# Patient Record
Sex: MALE | HISPANIC OR LATINO | Employment: FULL TIME | URBAN - METROPOLITAN AREA
[De-identification: names, ages, dates, MRNs, and addresses within clinical notes are randomized per-mention and may not be internally consistent; named-entity substitution may affect disease eponyms.]

---

## 2018-08-20 ENCOUNTER — OFFICE VISIT (OUTPATIENT)
Dept: URGENT CARE | Facility: PHYSICIAN GROUP | Age: 23
End: 2018-08-20
Payer: COMMERCIAL

## 2018-08-20 VITALS
BODY MASS INDEX: 32.9 KG/M2 | DIASTOLIC BLOOD PRESSURE: 88 MMHG | HEIGHT: 71 IN | OXYGEN SATURATION: 98 % | RESPIRATION RATE: 15 BRPM | TEMPERATURE: 98.8 F | SYSTOLIC BLOOD PRESSURE: 142 MMHG | WEIGHT: 235 LBS | HEART RATE: 91 BPM

## 2018-08-20 DIAGNOSIS — A08.4 VIRAL GASTROENTERITIS: ICD-10-CM

## 2018-08-20 DIAGNOSIS — R19.7 NAUSEA, VOMITING AND DIARRHEA: ICD-10-CM

## 2018-08-20 DIAGNOSIS — R11.2 NAUSEA, VOMITING AND DIARRHEA: ICD-10-CM

## 2018-08-20 PROCEDURE — 99204 OFFICE O/P NEW MOD 45 MIN: CPT | Performed by: NURSE PRACTITIONER

## 2018-08-20 RX ORDER — ONDANSETRON 4 MG/1
4 TABLET, ORALLY DISINTEGRATING ORAL EVERY 6 HOURS PRN
Qty: 10 TAB | Refills: 0 | Status: SHIPPED | OUTPATIENT
Start: 2018-08-20 | End: 2018-10-04

## 2018-08-20 ASSESSMENT — ENCOUNTER SYMPTOMS
ABDOMINAL PAIN: 1
BLOATING: 0
FEVER: 0
CHILLS: 0
VOMITING: 1
DIARRHEA: 1

## 2018-08-20 NOTE — LETTER
August 20, 2018         Patient: Gurwinder Robins   YOB: 1995   Date of Visit: 8/20/2018           To Whom it May Concern:    Gurwinder Robins was seen in my clinic on 8/20/2018. Please excuse him from work 8/19/18-8/21/18.        Sincerely,           JOSELUIS Martinez.  Electronically Signed

## 2018-08-20 NOTE — PROGRESS NOTES
Subjective:      Gurwinder Robins is a 23 y.o. male who presents with Diarrhea (Started Sunday. Nausea and one episode of vomiting.)            Diarrhea    This is a new problem. Episode onset: pt reports his whole family has come down with a stomach bug recently. Reports his wife and daughter had vomiting and diarrhea first and he developed the same symptoms yesterday. Denies any fever. Tolerating fluids. Last vomited yesterday. The problem occurs 2 to 4 times per day. The stool consistency is described as watery. The patient states that diarrhea does not awaken him from sleep. Associated symptoms include abdominal pain (gneralized) and vomiting. Pertinent negatives include no bloating, chills or fever. Associated symptoms comments: Reports when he thinks about eating it makes him feel nauseated. Risk factors include ill contacts. He has tried electrolyte solution for the symptoms. The treatment provided mild relief. There is no history of inflammatory bowel disease or irritable bowel syndrome.       Review of Systems   Constitutional: Negative for chills and fever.   Gastrointestinal: Positive for abdominal pain (gneralized), diarrhea and vomiting. Negative for bloating.   All other systems reviewed and are negative.    History reviewed. No pertinent past medical history. History reviewed. No pertinent surgical history.   Social History     Social History   • Marital status: Single     Spouse name: N/A   • Number of children: N/A   • Years of education: N/A     Occupational History   • Not on file.     Social History Main Topics   • Smoking status: Never Smoker   • Smokeless tobacco: Never Used   • Alcohol use Yes      Comment: on weekends   • Drug use: Yes     Types: Inhaled      Comment: THC   • Sexual activity: Not on file     Other Topics Concern   • Not on file     Social History Narrative   • No narrative on file          Objective:     /88   Pulse 91   Temp 37.1 °C (98.8 °F)   Resp 15   Ht  "1.803 m (5' 11\")   Wt 106.6 kg (235 lb)   SpO2 98%   BMI 32.78 kg/m²      Physical Exam   Constitutional: He is oriented to person, place, and time. Vital signs are normal. He appears well-developed and well-nourished.   HENT:   Head: Normocephalic and atraumatic.   Right Ear: Tympanic membrane and external ear normal.   Left Ear: Tympanic membrane and external ear normal.   Nose: Nose normal.   Mouth/Throat: Oropharynx is clear and moist.   Eyes: Pupils are equal, round, and reactive to light. EOM are normal.   Neck: Normal range of motion.   Cardiovascular: Normal rate and regular rhythm.    Pulmonary/Chest: Effort normal.   Abdominal: Soft. Normal appearance. Bowel sounds are increased. There is generalized tenderness. There is no rigidity, no rebound and no guarding.   Musculoskeletal: Normal range of motion.   Neurological: He is alert and oriented to person, place, and time.   Skin: Skin is warm and dry. Capillary refill takes less than 2 seconds.   Psychiatric: He has a normal mood and affect. His speech is normal and behavior is normal. Thought content normal.   Vitals reviewed.              Assessment/Plan:     1. Nausea, vomiting and diarrhea  - ondansetron (ZOFRAN ODT) 4 MG TABLET DISPERSIBLE; Take 1 Tab by mouth every 6 hours as needed for Nausea.  Dispense: 10 Tab; Refill: 0    2. Viral gastroenteritis    Get plenty of rest  Continue to drink electrolyte solution, when hungry encourage bland diet  Advised against anti-diarrheals  Work note provided  Strict ER precautions for localized ABD pain, increased vomiting, new onset of fevers  DDX discussed with patient include but are not limited to viral gastritis, ileus, SBO, colitis, appendicitis  Supportive care, differential diagnoses, and indications for immediate follow-up discussed with patient.    Pathogenesis of diagnosis discussed including typical length and natural progression.      Instructed to return to  or nearest emergency department if " symptoms fail to improve, for any change in condition, further concerns, or new concerning symptoms.  Patient states understanding of the plan of care and discharge instructions.

## 2018-09-14 ENCOUNTER — TELEPHONE (OUTPATIENT)
Dept: MEDICAL GROUP | Facility: PHYSICIAN GROUP | Age: 23
End: 2018-09-14

## 2018-09-14 NOTE — TELEPHONE ENCOUNTER
Future Appointments       Provider Department Center    9/17/2018 3:35 PM Sabiha Allen M.D. [facility]        NEW PATIENT VISIT PRE-VISIT PLANNING    1.  [employer] Patient is checked in Patient Demographics? YES    2.  Immunizations were updated in [facility] using WebIZ?: Yes       •  Web Iz Recommendations: FLU, HEPATITIS A  and TD    3.  Is this appointment scheduled as a Hospital Follow-Up? No    4.  Patient is due for the following Health Maintenance Topics:   Health Maintenance Due   Topic Date Due   • IMM DTaP/Tdap/Td Vaccine (7 - Td) 03/06/2017   • IMM INFLUENZA (1) 09/01/2018       5.  Reviewed/Updated the following with patient:   •   Preferred Pharmacy? NO       •   Preferred Lab? NO       •   Preferred Communication? NO       •   Allergies? NO       •   Medications? NO       •   Social History? NO       •   Family History (document living status of immediate family members and if + hx of cancer, diabetes, hypertension, hyperlipidemia, heart attack, stroke) NO    6.  Updated Care Team?       •   DME Company (gait device, O2, CPAP, etc.) NO       •   Other Specialists (eye doctor, derm, GYN, cardiology, endo, etc): NO    7.  MDX printed for Provider? NO    8.  Patient was NOT informed to arrive 15 min prior to their   scheduled appointment and bring in their medication bottles.

## 2018-10-03 ENCOUNTER — TELEPHONE (OUTPATIENT)
Dept: MEDICAL GROUP | Facility: PHYSICIAN GROUP | Age: 23
End: 2018-10-03

## 2018-10-03 NOTE — TELEPHONE ENCOUNTER
Future Appointments       Provider Department Center    10/4/2018 8:35 AM Sabiha Allen M.D. [facility]        NEW PATIENT VISIT PRE-VISIT PLANNING    1.  [employer] Patient is checked in Patient Demographics? YES    2.  Immunizations were updated in [facility] using WebIZ?: Yes       •  Web Iz Recommendations: FLU, HEPATITIS A  and TD    3.  Is this appointment scheduled as a Hospital Follow-Up? No    4.  Patient is due for the following Health Maintenance Topics:   Health Maintenance Due   Topic Date Due   • IMM DTaP/Tdap/Td Vaccine (7 - Td) 03/06/2017   • IMM INFLUENZA (1) 09/01/2018       5.  Reviewed/Updated the following with patient:   •   Preferred Pharmacy? YES       •   Preferred Lab? YES       •   Preferred Communication? YES       •   Allergies? YES       •   Medications? YES. Was Abstract Encounter opened and chart updated? YES       •   Social History? YES. Was Abstract Encounter opened and chart updated? YES       •   Family History (document living status of immediate family members and if + hx of cancer, diabetes, hypertension, hyperlipidemia, heart attack, stroke) YES. Was Abstract Encounter opened and chart updated? YES    6.  Updated Care Team?       •   DME Company (gait device, O2, CPAP, etc.) YES       •   Other Specialists (eye doctor, derm, GYN, cardiology, endo, etc): YES    7.  MDX printed for Provider? NO    8.  Patient was informed to arrive 15 min prior to their   scheduled appointment and bring in their medication bottles.

## 2018-10-04 ENCOUNTER — HOSPITAL ENCOUNTER (OUTPATIENT)
Dept: LAB | Facility: MEDICAL CENTER | Age: 23
End: 2018-10-04
Attending: FAMILY MEDICINE
Payer: COMMERCIAL

## 2018-10-04 ENCOUNTER — OFFICE VISIT (OUTPATIENT)
Dept: MEDICAL GROUP | Facility: PHYSICIAN GROUP | Age: 23
End: 2018-10-04
Payer: COMMERCIAL

## 2018-10-04 VITALS
HEART RATE: 76 BPM | WEIGHT: 243.3 LBS | TEMPERATURE: 97.5 F | OXYGEN SATURATION: 95 % | HEIGHT: 71 IN | DIASTOLIC BLOOD PRESSURE: 84 MMHG | SYSTOLIC BLOOD PRESSURE: 142 MMHG | BODY MASS INDEX: 34.06 KG/M2

## 2018-10-04 DIAGNOSIS — E66.9 OBESITY (BMI 30-39.9): ICD-10-CM

## 2018-10-04 DIAGNOSIS — F39 MOOD DISORDER (HCC): ICD-10-CM

## 2018-10-04 DIAGNOSIS — F12.10 MARIJUANA ABUSE: ICD-10-CM

## 2018-10-04 DIAGNOSIS — H00.15 CHALAZION LEFT LOWER EYELID: ICD-10-CM

## 2018-10-04 LAB
ALBUMIN SERPL BCP-MCNC: 4.4 G/DL (ref 3.2–4.9)
ALBUMIN/GLOB SERPL: 1.5 G/DL
ALP SERPL-CCNC: 59 U/L (ref 30–99)
ALT SERPL-CCNC: 56 U/L (ref 2–50)
ANION GAP SERPL CALC-SCNC: 6 MMOL/L (ref 0–11.9)
AST SERPL-CCNC: 25 U/L (ref 12–45)
BILIRUB SERPL-MCNC: 0.6 MG/DL (ref 0.1–1.5)
BUN SERPL-MCNC: 14 MG/DL (ref 8–22)
CALCIUM SERPL-MCNC: 9.6 MG/DL (ref 8.5–10.5)
CHLORIDE SERPL-SCNC: 105 MMOL/L (ref 96–112)
CHOLEST SERPL-MCNC: 170 MG/DL (ref 100–199)
CO2 SERPL-SCNC: 27 MMOL/L (ref 20–33)
CREAT SERPL-MCNC: 0.91 MG/DL (ref 0.5–1.4)
ERYTHROCYTE [DISTWIDTH] IN BLOOD BY AUTOMATED COUNT: 41.6 FL (ref 35.9–50)
EST. AVERAGE GLUCOSE BLD GHB EST-MCNC: 126 MG/DL
GLOBULIN SER CALC-MCNC: 3 G/DL (ref 1.9–3.5)
GLUCOSE SERPL-MCNC: 91 MG/DL (ref 65–99)
HBA1C MFR BLD: 6 % (ref 0–5.6)
HCT VFR BLD AUTO: 51.6 % (ref 42–52)
HDLC SERPL-MCNC: 48 MG/DL
HGB BLD-MCNC: 16.9 G/DL (ref 14–18)
LDLC SERPL CALC-MCNC: 78 MG/DL
MCH RBC QN AUTO: 29.5 PG (ref 27–33)
MCHC RBC AUTO-ENTMCNC: 32.8 G/DL (ref 33.7–35.3)
MCV RBC AUTO: 90.1 FL (ref 81.4–97.8)
PLATELET # BLD AUTO: 308 K/UL (ref 164–446)
PMV BLD AUTO: 10.1 FL (ref 9–12.9)
POTASSIUM SERPL-SCNC: 4.8 MMOL/L (ref 3.6–5.5)
PROT SERPL-MCNC: 7.4 G/DL (ref 6–8.2)
RBC # BLD AUTO: 5.73 M/UL (ref 4.7–6.1)
SODIUM SERPL-SCNC: 138 MMOL/L (ref 135–145)
TRIGL SERPL-MCNC: 219 MG/DL (ref 0–149)
TSH SERPL DL<=0.005 MIU/L-ACNC: 2.81 UIU/ML (ref 0.38–5.33)
VIT B12 SERPL-MCNC: 991 PG/ML (ref 211–911)
WBC # BLD AUTO: 8 K/UL (ref 4.8–10.8)

## 2018-10-04 PROCEDURE — 80061 LIPID PANEL: CPT

## 2018-10-04 PROCEDURE — 80053 COMPREHEN METABOLIC PANEL: CPT

## 2018-10-04 PROCEDURE — 84443 ASSAY THYROID STIM HORMONE: CPT

## 2018-10-04 PROCEDURE — 82607 VITAMIN B-12: CPT

## 2018-10-04 PROCEDURE — 99204 OFFICE O/P NEW MOD 45 MIN: CPT | Performed by: FAMILY MEDICINE

## 2018-10-04 PROCEDURE — 85027 COMPLETE CBC AUTOMATED: CPT

## 2018-10-04 PROCEDURE — 36415 COLL VENOUS BLD VENIPUNCTURE: CPT

## 2018-10-04 PROCEDURE — 83036 HEMOGLOBIN GLYCOSYLATED A1C: CPT

## 2018-10-04 RX ORDER — BACITRACIN ZINC AND POLYMYXIN B SULFATE 500; 10000 [USP'U]/G; [USP'U]/G
1 OINTMENT OPHTHALMIC EVERY 12 HOURS
Qty: 1 TUBE | Refills: 0 | Status: SHIPPED | OUTPATIENT
Start: 2018-10-04 | End: 2018-10-11

## 2018-10-04 ASSESSMENT — PATIENT HEALTH QUESTIONNAIRE - PHQ9
CLINICAL INTERPRETATION OF PHQ2 SCORE: 2
5. POOR APPETITE OR OVEREATING: 2 - MORE THAN HALF THE DAYS
SUM OF ALL RESPONSES TO PHQ QUESTIONS 1-9: 14

## 2018-10-04 NOTE — PROGRESS NOTES
"cc: low mood and anxiety       Subjective:     Gurwinder Robins is a 23 y.o. male presenting for the following:     Mood-patient presents today to discuss low mood.  He admits that since he was a teenager he has had difficulty with both daily worry and low mood.  He is slightly vague about his upbringing but does say that he had some instability and that there was some mental illness in his mother.  His sisters also struggled with their upbringing.    Struggles with worry that can keep up at night, low mood, anhedonia, low appetite.  He denies any suicidal ideation.    He is very interested in therapy for this and talking about his past.    Also patient mentions that for the last year he has had a painless bump on his left lower eyelid.  This is actually gotten slightly smaller.  It is never become painful.    Review of systems:  All others reviewed and are negative.       Current Outpatient Prescriptions:   •  bacitracin-polymyxin b (POLYSPORIN) 500-18085 UNIT/GM Ointment, Place 1 Drop in left eye every 12 hours for 7 days., Disp: 1 Tube, Rfl: 0    Allergies, past medical history, past surgical history, family history, social history reviewed and updated    Objective:     Vitals: /84 (BP Location: Left arm, Patient Position: Sitting, BP Cuff Size: Adult)   Pulse 76   Temp 36.4 °C (97.5 °F) (Temporal)   Ht 1.803 m (5' 11\")   Wt 110.4 kg (243 lb 4.8 oz)   SpO2 95%   BMI 33.93 kg/m²   General: Alert, pleasant, NAD  HEENT: Normocephalic.   EOMI, no icterus or pallor.  Conjunctivae  normal.  Left lower eyelid with small circular subcutaneous lump.  Nontender.  External ears normal. Oropharynx non-erythematous, mucous membranes moist.    Neck supple.  No thyromegaly or masses palpated. No cervical or supraclavicular lymphadenopathy.  Heart: Regular rate and rhythm.  S1 and S2 normal.  No murmurs appreciated.  Respiratory: Normal respiratory effort.  Clear to auscultation bilaterally.  Abdomen: " Non-distended, soft  Skin: Warm, dry, no rashes.  Musculoskeletal: Gait is normal.  Moves all extremities well.  Extremities: No leg edema.    Neurological: No tremors, CN2-12 grossly intact  Psych:  Affect is slightly blunted but appropriate, judgement is good, memory is intact, grooming is appropriate.    Assessment/Plan:     Gurwinder was seen today for depression and establish care.    Diagnoses and all orders for this visit:    Obesity (BMI 30-39.9): Spoke briefly about this today.  Patient encouraged to watch his diet.  -     LIPID PROFILE; Future  -     HEMOGLOBIN A1C; Future  -     Patient identified as having weight management issue.  Appropriate orders and counseling given.        Mood disorder (HCC): Patient with difficulty with low mood and daily worry since he was a teenager.  Seemingly with an unstable upbringing.  No suicidal ideation.  Will obtain bloods to ensure no medical cause for low mood, but this is unlikely.  Referral to behavioral health placed, as patient is very interested in therapy.    -     CBC WITHOUT DIFFERENTIAL; Future  -     TSH WITH REFLEX TO FT4; Future  -     VITAMIN B12; Future  -     COMP METABOLIC PANEL; Future  -     Patient has been identified as being depressed and appropriate orders and counseling have been given  -     REFERRAL TO BEHAVIORAL HEALTH    Marijuana abuse: Also spoke to patient today about marijuana use.  He has been a daily user since he was a teenager.  He did stop for almost a year while his partner was pregnant.  But he started using again about 7 months ago.  He uses daily and cannot get to sleep without this.  Explained that this is an addictive drug and that it does have mood effects.     Chalazion: Suggest warm compresses. No signs of infection currently. If drains, may use antibiotic eyedrop to avoid conjunctivitis.     Other orders  -     bacitracin-polymyxin b (POLYSPORIN) [id number] UNIT/GM Ointment; Place 1 Drop in left eye every 12 hours for 7  days.      Return if symptoms worsen or fail to improve.

## 2018-11-29 ENCOUNTER — OFFICE VISIT (OUTPATIENT)
Dept: URGENT CARE | Facility: PHYSICIAN GROUP | Age: 23
End: 2018-11-29
Payer: COMMERCIAL

## 2018-11-29 VITALS
HEIGHT: 71 IN | BODY MASS INDEX: 34.02 KG/M2 | OXYGEN SATURATION: 95 % | SYSTOLIC BLOOD PRESSURE: 122 MMHG | HEART RATE: 91 BPM | DIASTOLIC BLOOD PRESSURE: 76 MMHG | RESPIRATION RATE: 16 BRPM | TEMPERATURE: 98.5 F | WEIGHT: 243 LBS

## 2018-11-29 DIAGNOSIS — H69.92 DYSFUNCTION OF LEFT EUSTACHIAN TUBE: ICD-10-CM

## 2018-11-29 DIAGNOSIS — H60.502 ACUTE OTITIS EXTERNA OF LEFT EAR, UNSPECIFIED TYPE: ICD-10-CM

## 2018-11-29 PROCEDURE — 99214 OFFICE O/P EST MOD 30 MIN: CPT | Performed by: PHYSICIAN ASSISTANT

## 2018-11-29 RX ORDER — AMOXICILLIN AND CLAVULANATE POTASSIUM 875; 125 MG/1; MG/1
1 TABLET, FILM COATED ORAL 2 TIMES DAILY
Qty: 20 TAB | Refills: 0 | Status: SHIPPED | OUTPATIENT
Start: 2018-11-29 | End: 2018-12-09

## 2018-11-29 RX ORDER — OFLOXACIN 3 MG/ML
5 SOLUTION AURICULAR (OTIC) DAILY
Qty: 7 ML | Refills: 0 | Status: SHIPPED | OUTPATIENT
Start: 2018-11-29 | End: 2018-12-06

## 2018-11-29 ASSESSMENT — ENCOUNTER SYMPTOMS
CHILLS: 0
HEADACHES: 0
BLURRED VISION: 0
RHINORRHEA: 1
PALPITATIONS: 0
SORE THROAT: 0
DIARRHEA: 0
MYALGIAS: 0
ABDOMINAL PAIN: 0
DIZZINESS: 0
EYE PAIN: 0
SHORTNESS OF BREATH: 0
NAUSEA: 0
COUGH: 0
FEVER: 0
VOMITING: 0

## 2018-11-30 NOTE — PROGRESS NOTES
"Subjective:      Gurwinder Robins is a 23 y.o. male who presents with Otalgia (left ear pain started this am )      Otalgia    There is pain in the left ear. This is a new problem. The current episode started today. The problem occurs constantly. The problem has been waxing and waning. There has been no fever. The pain is moderate. Associated symptoms include rhinorrhea. Pertinent negatives include no abdominal pain, coughing, diarrhea, ear discharge, headaches, hearing loss, sore throat or vomiting. He has tried nothing for the symptoms. There is no history of a chronic ear infection, hearing loss or a tympanostomy tube.       Review of Systems   Constitutional: Negative for chills and fever.   HENT: Positive for congestion, ear pain and rhinorrhea. Negative for ear discharge, hearing loss, sore throat and tinnitus.    Eyes: Negative for blurred vision and pain.   Respiratory: Negative for cough and shortness of breath.    Cardiovascular: Negative for chest pain and palpitations.   Gastrointestinal: Negative for abdominal pain, diarrhea, nausea and vomiting.   Musculoskeletal: Negative for myalgias.   Neurological: Negative for dizziness and headaches.       PMH:  has no past medical history on file.  MEDS:   Current Outpatient Prescriptions:   •  amoxicillin-clavulanate (AUGMENTIN) 875-125 MG Tab, Take 1 Tab by mouth 2 times a day for 10 days., Disp: 20 Tab, Rfl: 0  •  ofloxacin otic sol (FLOXIN OTIC) 0.3 % Solution, Place 5 Drops in left ear every day for 7 days., Disp: 7 mL, Rfl: 0  ALLERGIES: No Known Allergies  SURGHX: No past surgical history on file.  SOCHX:  reports that he has never smoked. He has never used smokeless tobacco. He reports that he drinks alcohol. He reports that he uses drugs, including Inhaled and Marijuana.  FH: Family history was reviewed, no pertinent findings to report       Objective:     /76   Pulse 91   Temp 36.9 °C (98.5 °F) (Temporal)   Resp 16   Ht 1.803 m (5' 11\")   " Wt 110.2 kg (243 lb)   SpO2 95%   BMI 33.89 kg/m²        Physical Exam   Constitutional: He is oriented to person, place, and time. He appears well-developed and well-nourished.   HENT:   Head: Normocephalic and atraumatic.   Right Ear: Tympanic membrane, external ear and ear canal normal.   Left Ear: External ear normal. There is swelling. No mastoid tenderness. Tympanic membrane is erythematous and bulging.   Nose: Mucosal edema and rhinorrhea present. Right sinus exhibits no maxillary sinus tenderness and no frontal sinus tenderness. Left sinus exhibits no maxillary sinus tenderness and no frontal sinus tenderness.   Mouth/Throat: Uvula is midline and mucous membranes are normal. Posterior oropharyngeal erythema present.   Left ear canal exhibits erythema and swelling.  Left TM is erythematous and bulging with what looks like some pus more centrally.    Eyes: Pupils are equal, round, and reactive to light. Conjunctivae are normal.   Neck: Normal range of motion.   Cardiovascular: Normal rate, regular rhythm and normal heart sounds.    No murmur heard.  Pulmonary/Chest: Effort normal and breath sounds normal. He has no wheezes.   Lymphadenopathy:     He has no cervical adenopathy.   Neurological: He is alert and oriented to person, place, and time.   Skin: Skin is warm and dry. Capillary refill takes less than 2 seconds.   Psychiatric: He has a normal mood and affect. His behavior is normal. Judgment normal.   Vitals reviewed.        Assessment/Plan:     1. Acute otitis externa of left ear, unspecified type  - ofloxacin otic sol (FLOXIN OTIC) 0.3 % Solution; Place 5 Drops in left ear every day for 7 days.  Dispense: 7 mL; Refill: 0    2. Dysfunction of left eustachian tube  - amoxicillin-clavulanate (AUGMENTIN) 875-125 MG Tab; Take 1 Tab by mouth 2 times a day for 10 days.  Dispense: 20 Tab; Refill: 0  - ofloxacin otic sol (FLOXIN OTIC) 0.3 % Solution; Place 5 Drops in left ear every day for 7 days.  Dispense: 7  mL; Refill: 0        Differential Diagnosis, natural history, and supportive care discussed. Return to the Urgent Care or follow up with your PCP if symptoms fail to resolve, or for any new or worsening symptoms. Emergency room precautions discussed. Patient and/or family appears understanding of information.

## 2019-07-08 ENCOUNTER — OFFICE VISIT (OUTPATIENT)
Dept: URGENT CARE | Facility: CLINIC | Age: 24
End: 2019-07-08
Payer: COMMERCIAL

## 2019-07-08 VITALS
HEART RATE: 80 BPM | HEIGHT: 71 IN | SYSTOLIC BLOOD PRESSURE: 132 MMHG | RESPIRATION RATE: 18 BRPM | WEIGHT: 244 LBS | BODY MASS INDEX: 34.16 KG/M2 | OXYGEN SATURATION: 98 % | TEMPERATURE: 98.2 F | DIASTOLIC BLOOD PRESSURE: 90 MMHG

## 2019-07-08 DIAGNOSIS — R10.814 LEFT LOWER QUADRANT ABDOMINAL TENDERNESS WITHOUT REBOUND TENDERNESS: ICD-10-CM

## 2019-07-08 DIAGNOSIS — R10.32 LEFT LOWER QUADRANT PAIN: ICD-10-CM

## 2019-07-08 DIAGNOSIS — R19.7 DIARRHEA, UNSPECIFIED TYPE: ICD-10-CM

## 2019-07-08 DIAGNOSIS — R11.0 NAUSEA: ICD-10-CM

## 2019-07-08 LAB
APPEARANCE UR: CLEAR
BILIRUB UR STRIP-MCNC: NORMAL MG/DL
COLOR UR AUTO: YELLOW
GLUCOSE UR STRIP.AUTO-MCNC: NORMAL MG/DL
KETONES UR STRIP.AUTO-MCNC: NORMAL MG/DL
LEUKOCYTE ESTERASE UR QL STRIP.AUTO: NORMAL
NITRITE UR QL STRIP.AUTO: NORMAL
PH UR STRIP.AUTO: 6.5 [PH] (ref 5–8)
PROT UR QL STRIP: 100 MG/DL
RBC UR QL AUTO: NORMAL
SP GR UR STRIP.AUTO: 1.02
UROBILINOGEN UR STRIP-MCNC: 0.2 MG/DL

## 2019-07-08 PROCEDURE — 81002 URINALYSIS NONAUTO W/O SCOPE: CPT | Performed by: EMERGENCY MEDICINE

## 2019-07-08 PROCEDURE — 99203 OFFICE O/P NEW LOW 30 MIN: CPT | Performed by: EMERGENCY MEDICINE

## 2019-07-08 ASSESSMENT — ENCOUNTER SYMPTOMS
VOMITING: 1
NAUSEA: 1
DIARRHEA: 1
FEVER: 0
HEADACHES: 1
SENSORY CHANGE: 0
COUGH: 0
DIZZINESS: 1
ANOREXIA: 0
SORE THROAT: 0
BLOOD IN STOOL: 0
LOSS OF CONSCIOUSNESS: 0
FOCAL WEAKNESS: 0
FLANK PAIN: 0
ABDOMINAL PAIN: 1

## 2019-07-08 NOTE — LETTER
July 8, 2019       Patient: Gurwinder Robins   YOB: 1995   Date of Visit: 7/8/2019         To Whom It May Concern:    It is my medical opinion that Gurwinder Robins is not able to attend work today or tomorrow for medical reasons.      Sincerely,          Krishna Fitzgerald M.D.  Electronically Signed

## 2019-07-09 ENCOUNTER — HOSPITAL ENCOUNTER (OUTPATIENT)
Dept: LAB | Facility: MEDICAL CENTER | Age: 24
End: 2019-07-09
Attending: EMERGENCY MEDICINE
Payer: COMMERCIAL

## 2019-07-09 DIAGNOSIS — R10.32 LEFT LOWER QUADRANT PAIN: ICD-10-CM

## 2019-07-09 DIAGNOSIS — R19.7 DIARRHEA, UNSPECIFIED TYPE: ICD-10-CM

## 2019-07-09 LAB
ALBUMIN SERPL BCP-MCNC: 4.4 G/DL (ref 3.2–4.9)
ALBUMIN/GLOB SERPL: 1.5 G/DL
ALP SERPL-CCNC: 59 U/L (ref 30–99)
ALT SERPL-CCNC: 50 U/L (ref 2–50)
ANION GAP SERPL CALC-SCNC: 10 MMOL/L (ref 0–11.9)
AST SERPL-CCNC: 25 U/L (ref 12–45)
BASOPHILS # BLD AUTO: 0.3 % (ref 0–1.8)
BASOPHILS # BLD: 0.03 K/UL (ref 0–0.12)
BILIRUB SERPL-MCNC: 0.9 MG/DL (ref 0.1–1.5)
BUN SERPL-MCNC: 10 MG/DL (ref 8–22)
C DIFF DNA SPEC QL NAA+PROBE: NEGATIVE
C DIFF TOX GENS STL QL NAA+PROBE: NEGATIVE
CALCIUM SERPL-MCNC: 9.6 MG/DL (ref 8.5–10.5)
CHLORIDE SERPL-SCNC: 103 MMOL/L (ref 96–112)
CO2 SERPL-SCNC: 26 MMOL/L (ref 20–33)
CREAT SERPL-MCNC: 0.83 MG/DL (ref 0.5–1.4)
EOSINOPHIL # BLD AUTO: 0.29 K/UL (ref 0–0.51)
EOSINOPHIL NFR BLD: 3.2 % (ref 0–6.9)
ERYTHROCYTE [DISTWIDTH] IN BLOOD BY AUTOMATED COUNT: 41.6 FL (ref 35.9–50)
FASTING STATUS PATIENT QL REPORTED: NORMAL
GLOBULIN SER CALC-MCNC: 2.9 G/DL (ref 1.9–3.5)
GLUCOSE SERPL-MCNC: 80 MG/DL (ref 65–99)
HCT VFR BLD AUTO: 53.7 % (ref 42–52)
HGB BLD-MCNC: 18.1 G/DL (ref 14–18)
IMM GRANULOCYTES # BLD AUTO: 0.03 K/UL (ref 0–0.11)
IMM GRANULOCYTES NFR BLD AUTO: 0.3 % (ref 0–0.9)
LYMPHOCYTES # BLD AUTO: 3.01 K/UL (ref 1–4.8)
LYMPHOCYTES NFR BLD: 33.1 % (ref 22–41)
MCH RBC QN AUTO: 30.6 PG (ref 27–33)
MCHC RBC AUTO-ENTMCNC: 33.7 G/DL (ref 33.7–35.3)
MCV RBC AUTO: 90.7 FL (ref 81.4–97.8)
MONOCYTES # BLD AUTO: 0.69 K/UL (ref 0–0.85)
MONOCYTES NFR BLD AUTO: 7.6 % (ref 0–13.4)
NEUTROPHILS # BLD AUTO: 5.04 K/UL (ref 1.82–7.42)
NEUTROPHILS NFR BLD: 55.5 % (ref 44–72)
NRBC # BLD AUTO: 0 K/UL
NRBC BLD-RTO: 0 /100 WBC
PLATELET # BLD AUTO: 292 K/UL (ref 164–446)
PMV BLD AUTO: 10 FL (ref 9–12.9)
POTASSIUM SERPL-SCNC: 4.1 MMOL/L (ref 3.6–5.5)
PROT SERPL-MCNC: 7.3 G/DL (ref 6–8.2)
RBC # BLD AUTO: 5.92 M/UL (ref 4.7–6.1)
SODIUM SERPL-SCNC: 139 MMOL/L (ref 135–145)
WBC # BLD AUTO: 9.1 K/UL (ref 4.8–10.8)
WBC STL QL MICRO: NORMAL

## 2019-07-09 PROCEDURE — 36415 COLL VENOUS BLD VENIPUNCTURE: CPT

## 2019-07-09 PROCEDURE — 85025 COMPLETE CBC W/AUTO DIFF WBC: CPT

## 2019-07-09 PROCEDURE — 80053 COMPREHEN METABOLIC PANEL: CPT

## 2019-07-09 PROCEDURE — 89055 LEUKOCYTE ASSESSMENT FECAL: CPT

## 2019-07-09 PROCEDURE — 87493 C DIFF AMPLIFIED PROBE: CPT

## 2019-07-09 NOTE — PROGRESS NOTES
Subjective:      Gurwinder Robins is a 24 y.o. male who presents with Emesis (jsut today, diarrhea for 1 week , abdominal pain1 week , headache 1 day, dizziness 1 day )            Diarrhea    This is a new problem. The current episode started in the past 7 days. The problem occurs 5 to 10 times per day. The problem has been unchanged. The stool consistency is described as watery. Associated symptoms include abdominal pain, headaches and vomiting. Pertinent negatives include no coughing or fever. There are no known risk factors. He has tried nothing for the symptoms.   LLQ Pain   This is a new problem. The current episode started in the past 7 days. The onset quality is undetermined. The problem occurs daily. The problem has been waxing and waning. The pain is located in the LLQ. The quality of the pain is cramping. The abdominal pain does not radiate. Associated symptoms include diarrhea, headaches, nausea and vomiting. Pertinent negatives include no anorexia, dysuria, fever, frequency or hematuria. The pain is aggravated by eating. The pain is relieved by nothing. He has tried nothing for the symptoms.   Nausea   This is a new problem. The current episode started today. The problem has been rapidly improving. Associated symptoms include abdominal pain, headaches, nausea and vomiting. Pertinent negatives include no anorexia, congestion, coughing, fever, rash or sore throat. Nothing aggravates the symptoms. He has tried nothing for the symptoms.   One episode vomiting this am.    Review of Systems   Constitutional: Negative for fever.   HENT: Negative for congestion and sore throat.    Respiratory: Negative for cough.    Gastrointestinal: Positive for abdominal pain, diarrhea, nausea and vomiting. Negative for anorexia and blood in stool.   Genitourinary: Negative for dysuria, flank pain, frequency, hematuria and urgency.   Skin: Negative for rash.   Neurological: Positive for dizziness and headaches. Negative for  "sensory change, focal weakness and loss of consciousness.     PMH:  has no past medical history on file.  MEDS: No current outpatient prescriptions on file.  ALLERGIES: No Known Allergies  SURGHX: History reviewed. No pertinent surgical history.  SOCHX:  reports that he has never smoked. He has never used smokeless tobacco. He reports that he drinks alcohol. He reports that he uses drugs, including Inhaled and Marijuana.  FH: family history includes Depression in his mother; Diabetes in his paternal grandfather; No Known Problems in his brother, father, maternal grandfather, maternal grandmother, paternal grandmother, sister, sister, sister, sister, and sister.       Objective:     /90 (BP Location: Right arm, Patient Position: Sitting, BP Cuff Size: Adult)   Pulse 80   Temp 36.8 °C (98.2 °F) (Temporal)   Resp 18   Ht 1.803 m (5' 11\")   Wt 110.7 kg (244 lb)   SpO2 98%   BMI 34.03 kg/m²      Physical Exam   Constitutional: He is oriented to person, place, and time. He appears well-developed and well-nourished. He is cooperative. He does not have a sickly appearance. He does not appear ill. No distress.   HENT:   Head: Normocephalic.   Mouth/Throat: Oropharynx is clear and moist.   Eyes: Conjunctivae and lids are normal. No scleral icterus.   Neck: Phonation normal. Neck supple. No thyromegaly present.   Cardiovascular: Normal rate, regular rhythm and normal heart sounds.    Pulmonary/Chest: Effort normal and breath sounds normal.   Abdominal: Soft. Bowel sounds are normal. He exhibits no distension, no abdominal bruit and no mass. There is tenderness in the left lower quadrant. There is no rigidity, no rebound, no guarding and no CVA tenderness. No hernia.   Genitourinary: Testes normal and penis normal. Cremasteric reflex is present. Uncircumcised.   Neurological: He is alert and oriented to person, place, and time. He exhibits normal muscle tone. Gait normal.   Skin: Skin is warm and dry. "   Psychiatric: He has a normal mood and affect.          Appears stable enough to evaluate as outpatient; advised ED if any worsening.     Assessment/Plan:     1. Left lower quadrant pain  - CT-ABDOMEN-PELVIS WITH; Future  - CBC WITH DIFFERENTIAL; Future  - Comp Metabolic Panel; Future  Small protein- POCT Urinalysis    2. Diarrhea, unspecified type  - C Diff by PCR rflx Toxin; Future  - STOOL WBC'S; Future    3. Left lower quadrant abdominal tenderness without rebound tenderness  Advised clear liquid diet only pending tests    4. Nausea

## 2019-07-10 ENCOUNTER — TELEPHONE (OUTPATIENT)
Dept: URGENT CARE | Facility: PHYSICIAN GROUP | Age: 24
End: 2019-07-10

## 2019-07-10 NOTE — TELEPHONE ENCOUNTER
Contacted patient and informed him about lab results. He was happy for the phone call. He also stated he has an appointment at [facility], he just wasn't sure when since his girlfriend made the appointment. I will reach out to [facility] to verify his appointment/ make one if need be. I will let you know what I find out.

## 2019-07-10 NOTE — TELEPHONE ENCOUNTER
Please notify patient of negative or normal blood and stool testing.  Confirm ordered CT abd/pelvis to be performed.

## 2023-02-01 ENCOUNTER — OFFICE VISIT (OUTPATIENT)
Dept: URGENT CARE | Facility: PHYSICIAN GROUP | Age: 28
End: 2023-02-01
Payer: COMMERCIAL

## 2023-02-01 VITALS
WEIGHT: 255 LBS | OXYGEN SATURATION: 98 % | TEMPERATURE: 99.2 F | SYSTOLIC BLOOD PRESSURE: 156 MMHG | RESPIRATION RATE: 20 BRPM | HEART RATE: 96 BPM | BODY MASS INDEX: 32.73 KG/M2 | HEIGHT: 74 IN | DIASTOLIC BLOOD PRESSURE: 84 MMHG

## 2023-02-01 DIAGNOSIS — I10 ELEVATED BLOOD PRESSURE READING WITH DIAGNOSIS OF HYPERTENSION: ICD-10-CM

## 2023-02-01 DIAGNOSIS — R52 BODY ACHES: ICD-10-CM

## 2023-02-01 DIAGNOSIS — R50.9 FEVER, UNSPECIFIED FEVER CAUSE: ICD-10-CM

## 2023-02-01 DIAGNOSIS — K13.79 ACUTE PAIN OF MOUTH: ICD-10-CM

## 2023-02-01 LAB
FLUAV+FLUBV AG SPEC QL IA: NEGATIVE
INT CON NEG: NORMAL
INT CON NEG: NORMAL
INT CON POS: NORMAL
INT CON POS: NORMAL
S PYO AG THROAT QL: NEGATIVE

## 2023-02-01 PROCEDURE — 87804 INFLUENZA ASSAY W/OPTIC: CPT | Performed by: NURSE PRACTITIONER

## 2023-02-01 PROCEDURE — 87880 STREP A ASSAY W/OPTIC: CPT | Performed by: NURSE PRACTITIONER

## 2023-02-01 PROCEDURE — 99203 OFFICE O/P NEW LOW 30 MIN: CPT | Performed by: NURSE PRACTITIONER

## 2023-02-01 RX ORDER — LISINOPRIL 30 MG/1
30 TABLET ORAL
COMMUNITY
Start: 2022-12-29

## 2023-02-01 RX ORDER — AMLODIPINE BESYLATE 5 MG/1
5 TABLET ORAL DAILY
Qty: 30 TABLET | Refills: 0 | Status: SHIPPED | OUTPATIENT
Start: 2023-02-01

## 2023-02-01 RX ORDER — AMLODIPINE BESYLATE 5 MG/1
5 TABLET ORAL
COMMUNITY
Start: 2022-11-10

## 2023-02-01 RX ORDER — LISINOPRIL 30 MG/1
30 TABLET ORAL DAILY
Qty: 30 TABLET | Refills: 0 | Status: SHIPPED | OUTPATIENT
Start: 2023-02-01

## 2023-02-01 RX ORDER — LIDOCAINE HYDROCHLORIDE 20 MG/ML
5 SOLUTION OROPHARYNGEAL EVERY 6 HOURS PRN
Qty: 100 ML | Refills: 0 | Status: SHIPPED | OUTPATIENT
Start: 2023-02-01 | End: 2023-02-06

## 2023-02-01 ASSESSMENT — ENCOUNTER SYMPTOMS
EYE REDNESS: 0
CARDIOVASCULAR NEGATIVE: 1
CHILLS: 1
FEVER: 0
VOMITING: 0
SORE THROAT: 0
SENSORY CHANGE: 0
SHORTNESS OF BREATH: 0
WEAKNESS: 0
DIARRHEA: 1
DIZZINESS: 0
COUGH: 0
CONSTIPATION: 0
NAUSEA: 0
WHEEZING: 0
HEADACHES: 0
ABDOMINAL PAIN: 0
TINGLING: 0
MYALGIAS: 1
EYE DISCHARGE: 0
NECK PAIN: 0

## 2023-02-01 NOTE — LETTER
February 1, 2023       Patient: Gurwinder Quiroga   YOB: 1995   Date of Visit: 2/1/2023         To Whom It May Concern:    In my medical opinion, I recommend that Gurwinder Quiroga be excused from work today and tomorrow (2/2/2023) as he was evaluated in clinic.     If you have any questions or concerns, please don't hesitate to call [phone]          Sincerely,          JOSELUIS Reese.  Electronically Signed

## 2023-02-02 NOTE — PROGRESS NOTES
Subjective     Gurwinder Quiroga is a 27 y.o. male who presents with Other (cut on top of mouth,fever,chills,x1 day)            Other  Associated symptoms include chills and myalgias. Pertinent negatives include no abdominal pain, congestion, coughing, fever, headaches, nausea, neck pain, rash, sore throat, vomiting or weakness.   States has experienced a the roof of his mouth after eating chips yesterday.  Hurts to eat solid foods.  States able to eat soup today with no problems.    Experiencing body aches, chills that started earlier today.  Denies headache, sore throat, nasal congestion, runny nose or cough.  Denies shortness of breath or wheezing, no history of asthma.  Taking over-the-counter ibuprofen/Tylenol, med, last dose greater than 12 hours ago.  Denies nausea, vomiting but had one episode of diarrhea.    PMH:  has no past medical history on file.  MEDS:   Current Outpatient Medications:     lisinopril (PRINIVIL) 30 MG tablet, Take 30 mg by mouth every day. FOR BLOOD PRESSURE, Disp: , Rfl:     amLODIPine (NORVASC) 5 MG Tab, Take 5 mg by mouth every day. FOR BLOOD PRESSURE, Disp: , Rfl:     lisinopril (PRINIVIL) 30 MG tablet, Take 1 Tablet by mouth every day., Disp: 30 Tablet, Rfl: 0    amLODIPine (NORVASC) 5 MG Tab, Take 1 Tablet by mouth every day., Disp: 30 Tablet, Rfl: 0    lidocaine (XYLOCAINE) 2 % Solution, Take 5 mL by mouth every 6 hours as needed for Throat/Mouth Pain for up to 5 days., Disp: 100 mL, Rfl: 0  ALLERGIES: No Known Allergies  SURGHX: History reviewed. No pertinent surgical history.  SOCHX:  reports that he has never smoked. He has never used smokeless tobacco. He reports current alcohol use. He reports current drug use. Drugs: Inhaled and Marijuana.  FH: Family history was reviewed, no pertinent findings to report      Review of Systems   Constitutional:  Positive for chills. Negative for fever and malaise/fatigue.   HENT:  Negative for congestion, ear pain and sore throat.   "  Eyes:  Negative for discharge and redness.   Respiratory:  Negative for cough, shortness of breath and wheezing.    Cardiovascular: Negative.    Gastrointestinal:  Positive for diarrhea. Negative for abdominal pain, constipation, nausea and vomiting.   Musculoskeletal:  Positive for myalgias. Negative for neck pain.   Skin:  Negative for itching and rash.   Neurological:  Negative for dizziness, tingling, sensory change, weakness and headaches.   Endo/Heme/Allergies:  Negative for environmental allergies.   All other systems reviewed and are negative.           Objective     BP (!) 168/86 (BP Location: Left arm, Patient Position: Sitting, BP Cuff Size: Large adult)   Pulse (!) 114   Temp 37.3 °C (99.2 °F) (Temporal)   Resp 20   Ht 1.88 m (6' 2\")   Wt 116 kg (255 lb)   SpO2 98%   BMI 32.74 kg/m²      Physical Exam  Vitals reviewed.   Constitutional:       General: He is awake. He is not in acute distress.     Appearance: Normal appearance. He is well-developed. He is not ill-appearing, toxic-appearing or diaphoretic.   HENT:      Head: Normocephalic.      Right Ear: Tympanic membrane, ear canal and external ear normal.      Left Ear: Tympanic membrane, ear canal and external ear normal.      Nose: Nose normal.      Mouth/Throat:      Lips: Pink.      Mouth: Mucous membranes are dry. Lacerations present. No oral lesions or angioedema.      Dentition: No gingival swelling.      Pharynx: Oropharynx is clear. Uvula midline.        Comments: Pinpoint puncture wound to upper mouth at hard palate just behind the incisors.  No swelling or surrounding erythema, no drainage or food in wound.  Eyes:      Conjunctiva/sclera: Conjunctivae normal.      Pupils: Pupils are equal, round, and reactive to light.   Cardiovascular:      Rate and Rhythm: Normal rate and regular rhythm.      Heart sounds: Normal heart sounds, S1 normal and S2 normal. Heart sounds not distant. No murmur heard.    No friction rub. No gallop. "   Musculoskeletal:         General: Normal range of motion.      Cervical back: Normal range of motion and neck supple.      Right lower leg: No edema.      Left lower leg: No edema.   Skin:     General: Skin is warm and dry.   Neurological:      Mental Status: He is alert and oriented to person, place, and time.   Psychiatric:         Behavior: Behavior is cooperative.                           Assessment & Plan           1. Elevated blood pressure reading with diagnosis of hypertension    - lisinopril (PRINIVIL) 30 MG tablet; Take 1 Tablet by mouth every day.  Dispense: 30 Tablet; Refill: 0  - amLODIPine (NORVASC) 5 MG Tab; Take 1 Tablet by mouth every day.  Dispense: 30 Tablet; Refill: 0    2. Fever, unspecified fever cause    - POCT Rapid Strep A  - POCT Influenza A/B    3. Body aches    - POCT Rapid Strep A  - POCT Influenza A/B    4. Acute pain of mouth    - lidocaine (XYLOCAINE) 2 % Solution; Take 5 mL by mouth every 6 hours as needed for Throat/Mouth Pain for up to 5 days.  Dispense: 100 mL; Refill: 0    HTN:  -Avoid high salt, processed, high fatty foods  -Incorporate a healthy balanced diet of fruits, vegetables and lean meats  -Increase activity level daily  -Monitor high stress and poor sleeping habits  -Increase water intake and decrease caffeine and sugary drinks  -Invest in BP machine to monitor BP  -Keep log of BP readings for future PCP appointment   -Recheck with continuous elevated BP readings >150/90 with symptoms  -Monitor for signs of elevated HTN like dizziness, severe headache, vision change, nausea/vomiting, numbness in upper extremities, shortness of breath, chest pain/pressure- call 911 to evaluate immediately  -Sign up with new PCP ASAP to manage HTN     URI:  -Maintain hydration/water intake  -May use over the counter Ibuprofen/Tylenol as needed for any fever, body aches or throat pain  -May take long acting antihistamine for seasonal allergy symptoms as needed  -May use over the counter  saline nasal spray for nasal congestion as needed  -May use over the counter Nasacort/Flonase for nasal congestion as needed   -May use throat lozenges for throat discomfort as needed   -Change toothbrush after 24 hrs of initiating antibiotics   -May gargle with salt water up to 4x/day as needed for throat discomfort (1 tsp salt dissolved in 1 cup warm water)  -May drink smoothies for nutrition if too painful to swallow solid foods  -Monitor for any sinus pain/pressure with sinus congestion with thick mucus production, sinus headache, cough, shortness of breath, fever- need re-evaluation

## 2024-04-25 ENCOUNTER — OFFICE VISIT (OUTPATIENT)
Dept: MEDICAL GROUP | Facility: MEDICAL CENTER | Age: 29
End: 2024-04-25
Attending: FAMILY MEDICINE
Payer: MEDICAID

## 2024-04-25 VITALS
BODY MASS INDEX: 37.13 KG/M2 | WEIGHT: 289.2 LBS | HEART RATE: 76 BPM | TEMPERATURE: 97.7 F | DIASTOLIC BLOOD PRESSURE: 98 MMHG | RESPIRATION RATE: 16 BRPM | SYSTOLIC BLOOD PRESSURE: 132 MMHG | OXYGEN SATURATION: 97 %

## 2024-04-25 DIAGNOSIS — Z11.59 NEED FOR HEPATITIS C SCREENING TEST: ICD-10-CM

## 2024-04-25 DIAGNOSIS — F10.21 HISTORY OF ALCOHOLISM (HCC): ICD-10-CM

## 2024-04-25 DIAGNOSIS — I10 HYPERTENSION, UNSPECIFIED TYPE: ICD-10-CM

## 2024-04-25 DIAGNOSIS — Z11.4 SCREENING FOR HIV WITHOUT PRESENCE OF RISK FACTORS: ICD-10-CM

## 2024-04-25 DIAGNOSIS — F43.21 GRIEF: ICD-10-CM

## 2024-04-25 DIAGNOSIS — I10 ELEVATED BLOOD PRESSURE READING WITH DIAGNOSIS OF HYPERTENSION: ICD-10-CM

## 2024-04-25 DIAGNOSIS — F41.9 ANXIETY AND DEPRESSION: ICD-10-CM

## 2024-04-25 DIAGNOSIS — Z13.6 SCREENING FOR CARDIOVASCULAR CONDITION: ICD-10-CM

## 2024-04-25 DIAGNOSIS — K62.5 BRBPR (BRIGHT RED BLOOD PER RECTUM): ICD-10-CM

## 2024-04-25 DIAGNOSIS — F32.A ANXIETY AND DEPRESSION: ICD-10-CM

## 2024-04-25 DIAGNOSIS — E66.9 OBESITY (BMI 30-39.9): ICD-10-CM

## 2024-04-25 PROBLEM — F12.11 HISTORY OF CANNABIS ABUSE: Status: ACTIVE | Noted: 2018-10-04

## 2024-04-25 PROCEDURE — 99214 OFFICE O/P EST MOD 30 MIN: CPT | Performed by: FAMILY MEDICINE

## 2024-04-25 RX ORDER — LISINOPRIL 30 MG/1
30 TABLET ORAL DAILY
Qty: 90 TABLET | Refills: 1 | Status: SHIPPED | OUTPATIENT
Start: 2024-04-25

## 2024-04-25 RX ORDER — AMLODIPINE BESYLATE 5 MG/1
5 TABLET ORAL DAILY
Qty: 90 TABLET | Refills: 1 | Status: SHIPPED | OUTPATIENT
Start: 2024-04-25

## 2024-04-25 ASSESSMENT — PATIENT HEALTH QUESTIONNAIRE - PHQ9
5. POOR APPETITE OR OVEREATING: 3 - NEARLY EVERY DAY
CLINICAL INTERPRETATION OF PHQ2 SCORE: 6
SUM OF ALL RESPONSES TO PHQ QUESTIONS 1-9: 18

## 2024-04-25 ASSESSMENT — ANXIETY QUESTIONNAIRES
7. FEELING AFRAID AS IF SOMETHING AWFUL MIGHT HAPPEN: MORE THAN HALF THE DAYS
2. NOT BEING ABLE TO STOP OR CONTROL WORRYING: MORE THAN HALF THE DAYS
6. BECOMING EASILY ANNOYED OR IRRITABLE: MORE THAN HALF THE DAYS
1. FEELING NERVOUS, ANXIOUS, OR ON EDGE: MORE THAN HALF THE DAYS
4. TROUBLE RELAXING: MORE THAN HALF THE DAYS
GAD7 TOTAL SCORE: 14
IF YOU CHECKED OFF ANY PROBLEMS ON THIS QUESTIONNAIRE, HOW DIFFICULT HAVE THESE PROBLEMS MADE IT FOR YOU TO DO YOUR WORK, TAKE CARE OF THINGS AT HOME, OR GET ALONG WITH OTHER PEOPLE: SOMEWHAT DIFFICULT
5. BEING SO RESTLESS THAT IT IS HARD TO SIT STILL: MORE THAN HALF THE DAYS
3. WORRYING TOO MUCH ABOUT DIFFERENT THINGS: MORE THAN HALF THE DAYS

## 2024-04-25 NOTE — PROGRESS NOTES
Verbal consent was acquired by the patient to use [employer] ambient listening note generation during this visit     Subjective:     CC:    Chief Complaint   Patient presents with    Establish Care       HISTORY OF THE PRESENT ILLNESS: Gurwinder Quiroga is a 29 y.o. male. This pleasant patient is here today to establish primary care with me and discuss the following issues.   His prior PCP was at [facility]; last seen in 2020    Specialists   None current    History of Present Illness  The patient presents to the office to establish primary care. He is accompanied by his daughter Melissa.    The patient's last consultation with his primary care physician was in 2020, during which he was prescribed lisinopril 30 mg and amlodipine 5 mg, which he continues to take. He requires refills of these medications. He has a history of hypertension and a mood disorder.     The patient has been receiving counseling/therapy at [facility] for his anxiety and depression. His therapy sessions were beneficial, but recent family losses have exacerbated his condition. He identifies both anxiety and depression, and finds therapy beneficial in finding coping mechanisms to avoid anxiety. He has not sought psychiatric consultation and prefers to avoid medication. He expresses a desire to engage in therapy sessions, but would like to see another provider.    The patient experienced food poisoning in [place] approximately 2 to 3 weeks ago. Upon his return, he noticed bright red blood in his stool, which persisted for approximately a week. The blood is visible on both toilet water and tissue paper, and he is uncertain if blood is mixed in the stool. The bleeding ceased after a week, but he is unable to exert excessive pressure due to bleeding. During his food poisoning episode, he experienced stomach pain and diarrhea. He has no prior history of bloody stools. His bowel movements increase during periods of stress, with a maximum of 5 minutes.     He  used marijuana since he was 11 and has been clean since 2018. He denies using any other substances. He drank daily for a year after his uncle passed away in 2020. He started vaping again since 2024. He vapes every day without nicotine. He is a former smoker. He started smoking cigarettes when he was 13 years old. He smoked half a pack of cigarettes until he was 23 years old. He stopped drinking alcohol 3 weeks ago when he came back from [place]. He is looking to limit his alcohol intake moving forward. 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 He is in long term relationship with his partner/mother of children. He lives with his parents. He has 1 little brother and 2 sisters. He has a Great [employer] dog.   His maternal grandmother passed away recently. His paternal grandparents are alive. One of his paternal grandparents is on hospice right now. She had surgery and it did not go well. He started having seizures that caused holes in the brain. He thinks she had a cerebral aneurysm. She is just not herself anymore. He has 5 sisters and 1 brother. His mother has a history of depression. His paternal grandfather had diabetes. His sisters do not have any known medical problems. He has 1 son and 1 daughter. They are healthy at the moment.   The patient has no known allergies to medications.        Allergies: Patient has no known allergies.      [id number] - [place], [place] - [address] AT [email or web address]  [address] [address]  Phone: [phone] Fax: [phone]      Current Outpatient Medications   Medication Sig Dispense Refill    amLODIPine (NORVASC) 5 MG Tab Take 1 Tablet by mouth every day. 90 Tablet 1    lisinopril (PRINIVIL) 30 MG tablet Take 1 Tablet by mouth every day. 90 Tablet 1     No current facility-administered medications for this visit.       History reviewed. No pertinent past medical history.    History reviewed. No pertinent  surgical history.    Family History   Problem Relation Age of Onset    Diabetes Mother     Depression Mother     No Known Problems Father     No Known Problems Sister     No Known Problems Sister     No Known Problems Sister     No Known Problems Sister     No Known Problems Sister     No Known Problems Brother     No Known Problems Maternal Grandmother     No Known Problems Maternal Grandfather     Diabetes Paternal Grandmother     Diabetes Paternal Grandfather     No Known Problems Daughter     No Known Problems Son        Social History     Tobacco Use    Smoking status: Former     Types: Cigarettes    Smokeless tobacco: Never    Tobacco comments:     Started age 13; half pack a day until age 23 (total 10 years)   Vaping Use    Vaping Use: Every day    Start date: 1/1/2023    Substances: Flavoring   Substance Use Topics    Alcohol use: Not Currently     Comment: Previous heavy alcohol use; currently on occasion. Looking to maintain sobriety    Drug use: Not Currently     Types: Inhaled, Marijuana     Comment: THC;  prescription drug abuse with promethazine x 1 year (age [age])         Objective:     BP (!) 132/98   Pulse 76   Temp 36.5 °C (97.7 °F)   Resp 16   Wt (!) 131 kg (289 lb 3.2 oz)   SpO2 97%   BMI 37.13 kg/m²   Physical Exam  Constitutional: Alert, no distress  Skin: No rashes in visible areas  Eye: Conjunctiva clear, lids normal  Respiratory: Unlabored respiratory effort on room air, no cough, lungs clear to auscultation bilaterally  CV: RRR  MSK: Normal gait, moves all extremities  Psych: Alert and oriented x3, normal affect and mood    Depression Screening    Little interest or pleasure in doing things?  3 - nearly every day   Feeling down, depressed , or hopeless? 3 - nearly every day   Trouble falling or staying asleep, or sleeping too much?  2 - more than half the days   Feeling tired or having little energy?  3 - nearly every day   Poor appetite or overeating?  3 - nearly every day   Feeling  bad about yourself - or that you are a failure or have let yourself or your family down? 0 - not at all   Trouble concentrating on things, such as reading the newspaper or watching television? 2 - more than half the days   Moving or speaking so slowly that other people could have noticed.  Or the opposite - being so fidgety or restless that you have been moving around a lot more than usual?  2 - more than half the days   Thoughts that you would be better off dead, or of hurting yourself?  0 - not at all   Patient Health Questionnaire Score: 18       If depressive symptoms identified deferred to follow up visit unless specifically addressed in assesment and plan.    Interpretation of PHQ-9 Total Score   Score Severity   1-4 No Depression   5-9 Mild Depression   10-14 Moderate Depression   15-19 Moderately Severe Depression   20-27 Severe Depression    GAGE-7 Questionnaire    Feeling nervous, anxious, or on edge: More than half the days  Not being able to sop or control worrying: More than half the days  Worrying too much about different things: More than half the days  Trouble relaxing: More than half the days  Being so restless that it's hard to sit still: More than half the days  Becoming easily annoyed or irritable: More than half the days  Feeling afraid as if something awful might happen: More than half the days  Total: 14    Interpretation of GAGE 7 Total Score   Score Severity :  0-4 No Anxiety   5-9 Mild Anxiety  10-14 Moderate Anxiety  15-21 Severe Anxiety      Assessment & Plan:   29 y.o. male with the following -    1. Hypertension, unspecified type        2. Grief  Referral to Psychology      3. Anxiety and depression  TSH WITH REFLEX TO FT4    Referral to Psychology      4. BRBPR (bright red blood per rectum)  Comp Metabolic Panel    CBC WITH DIFFERENTIAL    OCCULT BLOOD FECES IMMUNOASSAY      5. History of alcoholism (HCC)        6. Obesity (BMI 30-39.9)  Lipid Profile      7. Screening for HIV without  presence of risk factors  HIV AG/AB COMBO ASSAY SCREENING      8. Need for hepatitis C screening test  HEP C VIRUS ANTIBODY      9. Screening for cardiovascular condition  HEMOGLOBIN A1C      10. Elevated blood pressure reading with diagnosis of hypertension  amLODIPine (NORVASC) 5 MG Tab    lisinopril (PRINIVIL) 30 MG tablet        Assessment & Plan  1. Hypertension.  Chronic, controlled.  Blood pressure is close to goal under 140/90 in the office today.  We will continue the current regimen. Continue heart healthy diet and at least 150min of aerobic exercise/week  - ASCVD risk %- no statin indicated at this time  - Medications refilled  - Reassess in 6-12 months    2.-3. Anxiety and depression.  - Chronic condition; uncontrolled. Reviewed treatment options including medication and counseling/therapy.  - Cognitive behavioral therapy (CBT) is an effective treatment for GAGE as are SSRIs.  SSRIs usually take 4-6 wks to titrate to have an effect   - Counseling for CBTi - consult placed to psychology  - Informed the patient of [email or web address] and the resources that are available    4. Hematochezia.  Acute; resolved. Patient deferred rectal exam today. The cause of the patient's hematochezia is likely a result of microtears due to excessive wiping from recent viral GI illness. Fecal occult blood test will be ordered. Should the fecal occult blood test yield positive results, a formal colonoscopy will be considered.    4. Health maintenance.  A comprehensive metabolic panel will be ordered to assess liver function, kidney function, electrolytes, blood count, and thyroid function. The patient will be provided with information regarding healthy habits.      Return if symptoms worsen or fail to improve, for routine annual wellness exam, lab follow up.    Please note that this dictation was created using voice recognition software. I have made every reasonable attempt to correct obvious errors, but I expect that there are errors  of grammar and possibly content that I did not discover before finalizing the note.

## 2024-09-20 ENCOUNTER — OFFICE VISIT (OUTPATIENT)
Dept: URGENT CARE | Facility: CLINIC | Age: 29
End: 2024-09-20
Payer: MEDICAID

## 2024-09-20 VITALS
HEIGHT: 72 IN | BODY MASS INDEX: 39.14 KG/M2 | OXYGEN SATURATION: 97 % | TEMPERATURE: 97.6 F | SYSTOLIC BLOOD PRESSURE: 142 MMHG | RESPIRATION RATE: 16 BRPM | WEIGHT: 289 LBS | HEART RATE: 90 BPM | DIASTOLIC BLOOD PRESSURE: 90 MMHG

## 2024-09-20 DIAGNOSIS — I10 ELEVATED BLOOD PRESSURE READING WITH DIAGNOSIS OF HYPERTENSION: ICD-10-CM

## 2024-09-20 DIAGNOSIS — R42 DIZZINESS: ICD-10-CM

## 2024-09-20 PROCEDURE — 99214 OFFICE O/P EST MOD 30 MIN: CPT

## 2024-09-20 PROCEDURE — 3077F SYST BP >= 140 MM HG: CPT

## 2024-09-20 PROCEDURE — 3080F DIAST BP >= 90 MM HG: CPT

## 2024-09-20 PROCEDURE — 93000 ELECTROCARDIOGRAM COMPLETE: CPT

## 2024-09-20 RX ORDER — AMLODIPINE AND BENAZEPRIL HYDROCHLORIDE 10; 20 MG/1; MG/1
1 CAPSULE ORAL DAILY
Qty: 30 CAPSULE | Refills: 1 | Status: SHIPPED | OUTPATIENT
Start: 2024-09-20

## 2024-09-20 RX ORDER — MECLIZINE HYDROCHLORIDE 25 MG/1
25 TABLET ORAL 3 TIMES DAILY PRN
Qty: 30 TABLET | Refills: 0 | Status: SHIPPED | OUTPATIENT
Start: 2024-09-20

## 2024-09-20 ASSESSMENT — ENCOUNTER SYMPTOMS
VOMITING: 0
FEVER: 0
PALPITATIONS: 0
TINGLING: 0
HEADACHES: 1
CHILLS: 0
TREMORS: 0
NAUSEA: 0
DIZZINESS: 1
ABDOMINAL PAIN: 0
SHORTNESS OF BREATH: 0

## 2024-09-21 NOTE — PROGRESS NOTES
Chief Complaint   Patient presents with    Dizziness     X 3 days/ fatigue/ difficulty sleeping        HISTORY OF PRESENT ILLNESS: Patient is a pleasant 29 y.o. male who presents to urgent care today increasing dizziness that is especially worse with changes of his head, for the last 3 days.  He denies any shortness of breath, no numbness or tingling or chest pain.  Patient has a history of hypertension, it is currently elevated today at 142/90, he states he has been taking his medications.  Patient denies any history related otherwise.    Patient Active Problem List    Diagnosis Date Noted    Hypertension 04/25/2024    History of alcoholism (HCC) 04/25/2024    Obesity (BMI 30-39.9) 10/04/2018    History of cannabis abuse 10/04/2018    Anxiety and depression 10/04/2018       Allergies:Patient has no known allergies.    Current Outpatient Medications Ordered in Epic   Medication Sig Dispense Refill    amlodipine-benazepril (LOTREL) 10-20 MG per capsule Take 1 Capsule by mouth every day. 30 Capsule 1    meclizine (ANTIVERT) 25 MG Tab Take 1 Tablet by mouth 3 times a day as needed for Dizziness. 30 Tablet 0     No current Epic-ordered facility-administered medications on file.       History reviewed. No pertinent past medical history.    Social History     Tobacco Use    Smoking status: Former     Types: Cigarettes    Smokeless tobacco: Never    Tobacco comments:     Started age 13; half pack a day until age 23 (total 10 years)   Vaping Use    Vaping status: Every Day    Start date: 1/1/2023    Substances: Flavoring   Substance Use Topics    Alcohol use: Not Currently     Comment: Previous heavy alcohol use; currently on occasion. Looking to maintain sobriety    Drug use: Not Currently     Types: Inhaled, Marijuana     Comment: THC;  prescription drug abuse with promethazine x 1 year (age [age])       Family Status   Relation Name Status    Mo  Alive    Fa  Alive    Sis  Alive    Sis  Alive    Sis  Alive    Sis  Alive     Sis  Alive    Bro  Alive    MGMo      MGFa      PGMo  Alive    PGFa  Alive    Trevon  Alive    Son  Alive   No partnership data on file     Family History   Problem Relation Age of Onset    Diabetes Mother     Depression Mother     No Known Problems Father     No Known Problems Sister     No Known Problems Sister     No Known Problems Sister     No Known Problems Sister     No Known Problems Sister     No Known Problems Brother     No Known Problems Maternal Grandmother     No Known Problems Maternal Grandfather     Diabetes Paternal Grandmother     Diabetes Paternal Grandfather     No Known Problems Daughter     No Known Problems Son        Review of Systems   Constitutional:  Positive for malaise/fatigue. Negative for chills and fever.   Respiratory:  Negative for shortness of breath.    Cardiovascular:  Negative for chest pain, palpitations and leg swelling.   Gastrointestinal:  Negative for abdominal pain, nausea and vomiting.   Neurological:  Positive for dizziness and headaches. Negative for tingling and tremors.       Exam:  BP (!) 142/90   Pulse 90   Temp 36.4 °C (97.6 °F)   Resp 16   Ht 1.829 m (6')   Wt (!) 131 kg (289 lb)   SpO2 97%   Physical Exam  Vitals reviewed.   Constitutional:       Appearance: Normal appearance. He is obese.   HENT:      Head: Normocephalic.      Right Ear: Tympanic membrane and ear canal normal. There is no impacted cerumen.      Left Ear: Tympanic membrane and ear canal normal. There is no impacted cerumen.      Nose: No congestion.      Mouth/Throat:      Mouth: Mucous membranes are moist.      Pharynx: Oropharynx is clear. No oropharyngeal exudate or posterior oropharyngeal erythema.   Eyes:      General:         Right eye: No discharge.         Left eye: No discharge.      Extraocular Movements: Extraocular movements intact.      Conjunctiva/sclera: Conjunctivae normal.      Pupils: Pupils are equal, round, and reactive to light.   Cardiovascular:       Rate and Rhythm: Normal rate and regular rhythm.      Pulses: Normal pulses.      Heart sounds: Normal heart sounds. No murmur heard.     Comments: Noted elevated blood pressure of 142/90  Pulmonary:      Effort: Pulmonary effort is normal. No respiratory distress.      Breath sounds: Normal breath sounds. No stridor. No wheezing or rhonchi.   Chest:      Chest wall: No tenderness.   Abdominal:      General: Abdomen is flat. Bowel sounds are normal.      Palpations: Abdomen is soft. There is no mass.      Tenderness: There is no right CVA tenderness or left CVA tenderness.   Musculoskeletal:         General: No swelling, tenderness, deformity or signs of injury. Normal range of motion.      Cervical back: Normal range of motion.      Right lower leg: No edema.      Left lower leg: No edema.   Lymphadenopathy:      Cervical: No cervical adenopathy.   Skin:     General: Skin is warm and dry.      Capillary Refill: Capillary refill takes less than 2 seconds.      Findings: No bruising or rash.   Neurological:      General: No focal deficit present.      Mental Status: He is alert.      GCS: GCS eye subscore is 4. GCS verbal subscore is 5. GCS motor subscore is 6.      Cranial Nerves: No dysarthria or facial asymmetry.      Sensory: No sensory deficit.      Motor: No weakness.      Coordination: Romberg sign negative. Coordination normal.      Gait: Gait normal.   Psychiatric:         Mood and Affect: Mood normal.         Behavior: Behavior normal.         Thought Content: Thought content normal.         Judgment: Judgment normal.         Assessment/Plan:  1. Dizziness  - EKG - Clinic Performed  - Referral back to PCP  - meclizine (ANTIVERT) 25 MG Tab; Take 1 Tablet by mouth 3 times a day as needed for Dizziness.  Dispense: 30 Tablet; Refill: 0    2. Elevated blood pressure reading with diagnosis of hypertension  - amlodipine-benazepril (LOTREL) 10-20 MG per capsule; Take 1 Capsule by mouth every day.  Dispense: 30  Capsule; Refill: 1  - Referral back to PCP    Based on physical exam along with review of systems I did order an EKG today, this shows sinus rhythm, rate within normal limits.  Patient does have ongoing complaints of dizziness for the last 3 days, neuroexam appears within normal limits, no slurred speech, no noted facial droop,  are equal bilaterally.  No sensory loss that I can assess here in the office.  NIH stroke scale is 0.  Lung sounds are clear to auscultation, no cardiac murmurs, no bilateral lower extremity edema.  I do believe this is likely an inner ear issue, patient placed on Antivert.  Patient does appear to have ongoing elevated blood pressure despite being on oral medications at home.  Will attempt a combination pill in the form of low trill with a slightly increased dose.  Perhaps he will have better results with this.  Patient advised to continue to keep a log at home, ultimately to follow-up with primary care provider for ongoing blood pressure issues.  Patient does appear to have lab orders in place, there is currently no expiration on date on these, I did advise to please get his labs drawn for his primary care provider so we can ensure his kidney status.  Patient is aware of the plan and agreeable.  Patient advised that should his symptoms continue to get worse and not improved please follow-up with the emergency room.    Supportive care, differential diagnoses, and indications for immediate follow-up discussed with patient.   Pathogenesis of diagnosis discussed including typical length and natural progression.   Instructed to return to clinic or nearest emergency department for any change in condition, further concerns, or worsening of symptoms.  Patient states understanding of the plan of care and discharge instructions.  Instructed to make an appointment, for follow up, with primary care provider.    Please note that this dictation was created using voice recognition software. I have  made every reasonable attempt to correct obvious errors, but I expect that there are errors of grammar and possibly content that I did not discover before finalizing the note.      Jada Joaquin APRN

## 2024-10-23 ENCOUNTER — OFFICE VISIT (OUTPATIENT)
Dept: URGENT CARE | Facility: CLINIC | Age: 29
End: 2024-10-23
Payer: MEDICAID

## 2024-10-23 VITALS
OXYGEN SATURATION: 96 % | TEMPERATURE: 97.8 F | BODY MASS INDEX: 38.8 KG/M2 | HEIGHT: 73 IN | WEIGHT: 292.8 LBS | HEART RATE: 80 BPM | RESPIRATION RATE: 20 BRPM

## 2024-10-23 DIAGNOSIS — H00.015 HORDEOLUM EXTERNUM OF LEFT LOWER EYELID: ICD-10-CM

## 2024-10-23 PROCEDURE — 99212 OFFICE O/P EST SF 10 MIN: CPT | Performed by: NURSE PRACTITIONER

## 2024-10-23 RX ORDER — ERYTHROMYCIN 5 MG/G
1 OINTMENT OPHTHALMIC 3 TIMES DAILY
Qty: 1 G | Refills: 0 | Status: SHIPPED | OUTPATIENT
Start: 2024-10-23 | End: 2024-11-06

## 2025-06-29 ENCOUNTER — APPOINTMENT (OUTPATIENT)
Dept: URGENT CARE | Facility: PHYSICIAN GROUP | Age: 30
End: 2025-06-29